# Patient Record
Sex: MALE | Race: BLACK OR AFRICAN AMERICAN | NOT HISPANIC OR LATINO | Employment: UNEMPLOYED | URBAN - METROPOLITAN AREA
[De-identification: names, ages, dates, MRNs, and addresses within clinical notes are randomized per-mention and may not be internally consistent; named-entity substitution may affect disease eponyms.]

---
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
## 2024-05-18 NOTE — TELEPHONE ENCOUNTER
I placed a print out in the TC basket, signed as Covering for Dr. Barrientos.  Thanks,  Anaid Mackay MD MPH    1
